# Patient Record
Sex: MALE | URBAN - METROPOLITAN AREA
[De-identification: names, ages, dates, MRNs, and addresses within clinical notes are randomized per-mention and may not be internally consistent; named-entity substitution may affect disease eponyms.]

---

## 2020-03-24 ENCOUNTER — VIRTUAL VISIT (OUTPATIENT)
Dept: FAMILY MEDICINE | Facility: OTHER | Age: 25
End: 2020-03-24

## 2020-03-24 NOTE — PROGRESS NOTES
"Date: 2020 10:00:09  Clinician: Zane Macias  Clinician NPI: [id number]  Patient: oJhn Mckinnon  Patient : 1995  Patient Address: [address] [place] [address]  Patient Phone: [phone]  Visit Protocol: URI  Patient Summary:  John is a 25 year old ( : 1995 ) male who initiated a Visit for cold, sinus infection, or influenza. When asked the question \"Please sign me up to receive news, health information and promotions from [employer].\", John responded \"No\".    John states his symptoms started suddenly 3-6 days ago. After his symptoms started, they improved and then got worse again.   His symptoms consist of a sore throat and malaise.   Symptom details   Sore throat: John reports having moderate throat pain (4-6 on a 10 point pain scale), does not have exudate on his tonsils, and can swallow liquids. The lymph nodes in his neck are not enlarged. A rash has not appeared on the skin since the sore throat started.    John denies having ear pain, rhinitis, enlarged lymph nodes, facial pain or pressure, myalgias, wheezing, cough, nasal congestion, chills, teeth pain, headache, and fever. He also denies taking antibiotic medication for the symptoms and having recent facial or sinus surgery in the past 60 days. He is not experiencing dyspnea.   Precipitating events  John is not sure if he has been exposed to someone with strep throat.   Pertinent COVID-19 (Coronavirus) information  John has not traveled internationally or to the areas where COVID-19 (Coronavirus) is widespread, including cruise ship travel in the last 14 days before the start of his symptoms.   John has not had a close contact with a laboratory-confirmed COVID-19 patient within 14 days of symptom onset. He also has not had a close contact with a suspected COVID-19 patient within 14 days of symptom onset.   John is not a healthcare worker and does not work in a healthcare facility.   Pertinent medical " history  John needs a return to work/school note.   Weight: 165 lbs   John does not smoke or use smokeless tobacco.   Weight: 165 lbs    MEDICATIONS: No current medications, ALLERGIES: NKDA  Clinician Response:  Dear John Lopez,  Based on your symptoms I am concerned that you may potentially have strep throat. I have sent in a prescription for penicillin to be taken twice a day for 10 days. Additional symptomatic treatment recommendations: Push fluids, get plenty of rest. Tylenol and ibuprofen to help with pain. Salt water gargles, anesthetic throat pain, or lozenges to help with pain.      Diagnosis: Pain in throat  Diagnosis ICD: R07.0  Prescription: penicillin V potassium 500 mg oral tablet 20 tablet, 10 days supply. Take 1 tablet by mouth every 12 hours for 10 days. Refills: 0, Refill as needed: no, Allow substitutions: yes